# Patient Record
Sex: MALE | URBAN - METROPOLITAN AREA
[De-identification: names, ages, dates, MRNs, and addresses within clinical notes are randomized per-mention and may not be internally consistent; named-entity substitution may affect disease eponyms.]

---

## 2018-11-20 ENCOUNTER — OFFICE VISIT (OUTPATIENT)
Dept: URBAN - METROPOLITAN AREA CLINIC 23 | Facility: CLINIC | Age: 33
End: 2018-11-20
Payer: COMMERCIAL

## 2018-11-20 DIAGNOSIS — H11.32 SUBCONJUNCTIVAL HEMORRHAGE OF LEFT EYE: Primary | ICD-10-CM

## 2018-11-20 PROCEDURE — 99202 OFFICE O/P NEW SF 15 MIN: CPT | Performed by: OPTOMETRIST

## 2018-11-20 ASSESSMENT — INTRAOCULAR PRESSURE
OD: 17
OS: 15

## 2018-11-20 ASSESSMENT — KERATOMETRY
OS: 44.13
OD: 43.75

## 2018-11-20 NOTE — IMPRESSION/PLAN
Impression: Subconjunctival hemorrhage of left eye: H11.32. Plan: No abrasion or FB seen. Pt to continue Tobradex QID for a few more days. Ok to return to work.